# Patient Record
(demographics unavailable — no encounter records)

---

## 2024-11-14 NOTE — HISTORY OF PRESENT ILLNESS
[FreeTextEntry1] : 20 yo male, recent dizziness, epigastric pain, bad breath and early satiety. Occ loose bms, sometimes constipated. States normal tests recently with pcp. Voluntary weight loss of 9 lbs since 1/24.Admits to diet low in fiber.

## 2024-11-14 NOTE — ASSESSMENT
[FreeTextEntry1] : 22 yo male, recent dizziness, epigastric pain, bad breath and early satiety. Occ loose bms, sometimes constipated. States normal tests recently with pcp. Voluntary weight loss of 9 lbs since 1/24.Admits to diet low in fiber.   IMP: 1. gerd 2. exclude Hpylori 3. IBS  PLAN: 1. UBT 2. trial of esomeprazole 40 mg po qd x 30 days 3 RTO 4 weeks 4. If no improvement will consider egd 5. Advised to start either citrucel or benefiber daily

## 2024-11-14 NOTE — PHYSICAL EXAM
[General Appearance - Alert] : alert [General Appearance - In No Acute Distress] : in no acute distress [PERRL With Normal Accommodation] : pupils were equal in size, round, and reactive to light [Extraocular Movements] : extraocular movements were intact [Outer Ear] : the ears and nose were normal in appearance [Neck Appearance] : the appearance of the neck was normal [Neck Cervical Mass (___cm)] : no neck mass was observed [Jugular Venous Distention Increased] : there was no jugular-venous distention [Thyroid Diffuse Enlargement] : the thyroid was not enlarged [Thyroid Nodule] : there were no palpable thyroid nodules [Heart Sounds] : normal S1 and S2 [Heart Sounds Gallop] : no gallops [Heart Sounds Pericardial Friction Rub] : no pericardial rub [Full Pulse] : the pedal pulses are present [Edema] : there was no peripheral edema [Abdomen Mass (___ Cm)] : no abdominal mass palpated [Cervical Lymph Nodes Enlarged Posterior Bilaterally] : posterior cervical [Cervical Lymph Nodes Enlarged Anterior Bilaterally] : anterior cervical [Supraclavicular Lymph Nodes Enlarged Bilaterally] : supraclavicular [Axillary Lymph Nodes Enlarged Bilaterally] : axillary [Femoral Lymph Nodes Enlarged Bilaterally] : femoral [Inguinal Lymph Nodes Enlarged Bilaterally] : inguinal [No CVA Tenderness] : no ~M costovertebral angle tenderness [No Spinal Tenderness] : no spinal tenderness [Abnormal Walk] : normal gait [Nail Clubbing] : no clubbing  or cyanosis of the fingernails [Musculoskeletal - Swelling] : no joint swelling seen [Motor Tone] : muscle strength and tone were normal [Deep Tendon Reflexes (DTR)] : deep tendon reflexes were 2+ and symmetric [Sensation] : the sensory exam was normal to light touch and pinprick [No Focal Deficits] : no focal deficits [Impaired Insight] : insight and judgment were intact [Affect] : the affect was normal [Alert] : alert [Normal Voice/Communication] : normal voice/communication [Healthy Appearing] : healthy appearing [No Acute Distress] : no acute distress [Sclera] : the sclera and conjunctiva were normal [Hearing Threshold Finger Rub Not [place]] : hearing was normal [Normal Lips/Gums] : the lips and gums were normal [Oropharynx] : the oropharynx was normal [Normal Appearance] : the appearance of the neck was normal [No Neck Mass] : no neck mass was observed [No Respiratory Distress] : no respiratory distress [No Acc Muscle Use] : no accessory muscle use [Respiration, Rhythm And Depth] : normal respiratory rhythm and effort [Auscultation Breath Sounds / Voice Sounds] : lungs were clear to auscultation bilaterally [Heart Rate And Rhythm] : heart rate was normal and rhythm regular [Normal S1, S2] : normal S1 and S2 [Murmurs] : no murmurs [Bowel Sounds] : normal bowel sounds [Abdomen Tenderness] : non-tender [No Masses] : no abdominal mass palpated [Abdomen Soft] : soft [] : no hepatosplenomegaly [Oriented To Time, Place, And Person] : oriented to person, place, and time

## 2024-11-14 NOTE — REASON FOR VISIT
[Follow-up] : a follow-up of an existing diagnosis [FreeTextEntry1] : bad breath, altered bms, dizziness, vol. weight loss.

## 2024-11-14 NOTE — REVIEW OF SYSTEMS
[Recent Weight Gain (___ Lbs)] : recent [unfilled] ~Ulb weight gain [Heartburn] : heartburn [As Noted in HPI] : as noted in HPI [Abdominal Pain] : abdominal pain [Constipation] : constipation [Diarrhea] : diarrhea [Bloating (gassiness)] : bloating [Negative] : Heme/Lymph

## 2025-03-20 NOTE — ASSESSMENT
[FreeTextEntry1] : 11/2024: 20 yo male, recent dizziness, epigastric pain, bad breath and early satiety. Occ loose bms, sometimes constipated. States normal tests recently with pcp. Voluntary weight loss of 9 lbs since 1/24.Admits to diet low in fiber.  RTO today 3/20/25 for continued epigastric/abdominal pain. Patient tried taking omeprazole and felt worse. Complains of constant hunger but feels full, with associated headaches. Followed up with Endocrinologist, found to have slightly high calcium and elevated EBV antibody panel. Reviewed recent lab work from 2/2025, LFT's normal. Denies fevers, sob or cp. Complains of altered bowel function with diarrhea. Will order abdominal US, stool tests and schedule EGD. Follow up results.   Plan: 1. Ordered stool tests 2. Ordered abdominal US 3. Schedule EGD 4. Follow up 2-3 months.    Patient was advised to undergo endoscopy to which they agreed. The procedure will be performed in [facility] with the assistance of an anesthesiologist. They were given a booklet distributed by the [employer] explaining the procedure in detail and they understood the risks of the procedure not limited to infection, bleeding, perforation or non- diagnosis of gastric or esophageal cancer. Patient was advised that they could not drive home, if they choose to receive sedation.   Further diagnostic and treatment recommendations will be based upon the procedure and any biopsies, if they are taken.   Thank you for allowing me to participate in this patient's health care.

## 2025-03-20 NOTE — HISTORY OF PRESENT ILLNESS
[FreeTextEntry1] : 11/2024: 22 yo male, recent dizziness, epigastric pain, bad breath and early satiety. Occ loose bms, sometimes constipated. States normal tests recently with pcp. Voluntary weight loss of 9 lbs since 1/24.Admits to diet low in fiber.  RTO today 3/20/25 for continued epigastric/abdominal pain. Patient tried taking omeprazole and felt worse. Complains of constant hunger but feels full, with associated headaches. Followed up with Endocrinologist, found to have slightly high calcium and elevated EBV antibody panel. Reviewed recent lab work from 2/2025, LFT's normal. Denies fevers, sob or cp. Complains of altered bowel function with diarrhea. Will order abdominal US, stool tests and schedule EGD. Follow up results.